# Patient Record
Sex: FEMALE | Race: BLACK OR AFRICAN AMERICAN | Employment: FULL TIME | URBAN - METROPOLITAN AREA
[De-identification: names, ages, dates, MRNs, and addresses within clinical notes are randomized per-mention and may not be internally consistent; named-entity substitution may affect disease eponyms.]

---

## 2020-03-10 DIAGNOSIS — D50.9 IRON DEFICIENCY ANEMIA, UNSPECIFIED IRON DEFICIENCY ANEMIA TYPE: ICD-10-CM

## 2020-03-10 DIAGNOSIS — K50.119 CROHN'S DISEASE OF COLON WITH COMPLICATION (HCC): ICD-10-CM

## 2020-03-10 RX ORDER — SODIUM CHLORIDE 9 MG/ML
INJECTION, SOLUTION INTRAVENOUS CONTINUOUS
Status: CANCELLED | OUTPATIENT
Start: 2020-03-17

## 2020-03-10 RX ORDER — DIPHENHYDRAMINE HYDROCHLORIDE 50 MG/ML
50 INJECTION INTRAMUSCULAR; INTRAVENOUS ONCE
Status: CANCELLED | OUTPATIENT
Start: 2020-03-17

## 2020-03-10 RX ORDER — EPINEPHRINE 1 MG/ML
0.3 INJECTION, SOLUTION, CONCENTRATE INTRAVENOUS PRN
Status: CANCELLED | OUTPATIENT
Start: 2020-03-17

## 2020-03-10 RX ORDER — SODIUM CHLORIDE 0.9 % (FLUSH) 0.9 %
10 SYRINGE (ML) INJECTION PRN
Status: CANCELLED | OUTPATIENT
Start: 2020-03-17

## 2020-03-10 RX ORDER — METHYLPREDNISOLONE SODIUM SUCCINATE 125 MG/2ML
125 INJECTION, POWDER, LYOPHILIZED, FOR SOLUTION INTRAMUSCULAR; INTRAVENOUS ONCE
Status: CANCELLED | OUTPATIENT
Start: 2020-03-17

## 2020-04-13 RX ORDER — MEPERIDINE HYDROCHLORIDE 25 MG/ML
12.5 INJECTION INTRAMUSCULAR; INTRAVENOUS; SUBCUTANEOUS ONCE
Status: CANCELLED | OUTPATIENT
Start: 2020-04-22

## 2020-04-13 RX ORDER — SODIUM CHLORIDE 9 MG/ML
INJECTION, SOLUTION INTRAVENOUS CONTINUOUS
Status: CANCELLED | OUTPATIENT
Start: 2020-04-22

## 2020-04-13 RX ORDER — METHYLPREDNISOLONE SODIUM SUCCINATE 125 MG/2ML
125 INJECTION, POWDER, LYOPHILIZED, FOR SOLUTION INTRAMUSCULAR; INTRAVENOUS ONCE
Status: CANCELLED | OUTPATIENT
Start: 2020-04-22

## 2020-04-13 RX ORDER — DIPHENHYDRAMINE HYDROCHLORIDE 50 MG/ML
50 INJECTION INTRAMUSCULAR; INTRAVENOUS ONCE
Status: CANCELLED | OUTPATIENT
Start: 2020-04-22

## 2020-04-13 RX ORDER — SODIUM CHLORIDE 0.9 % (FLUSH) 0.9 %
10 SYRINGE (ML) INJECTION PRN
Status: CANCELLED | OUTPATIENT
Start: 2020-04-22

## 2020-04-13 RX ORDER — EPINEPHRINE 1 MG/ML
0.3 INJECTION, SOLUTION, CONCENTRATE INTRAVENOUS PRN
Status: CANCELLED | OUTPATIENT
Start: 2020-04-22

## 2020-04-24 ENCOUNTER — TELEPHONE (OUTPATIENT)
Dept: INFUSION THERAPY | Age: 55
End: 2020-04-24

## 2020-04-24 NOTE — TELEPHONE ENCOUNTER
Called and left a message on voicemail to return call to Infusion Dept regarding an order for iron infusions we have from Dr. Bryan Haskins.

## 2020-05-04 ENCOUNTER — TELEPHONE (OUTPATIENT)
Dept: INFUSION THERAPY | Age: 55
End: 2020-05-04

## 2020-05-05 ENCOUNTER — HOSPITAL ENCOUNTER (OUTPATIENT)
Dept: INFUSION THERAPY | Age: 55
Setting detail: INFUSION SERIES
Discharge: HOME | End: 2020-05-05
Payer: COMMERCIAL

## 2020-05-05 VITALS
TEMPERATURE: 98.5 F | OXYGEN SATURATION: 100 % | SYSTOLIC BLOOD PRESSURE: 139 MMHG | RESPIRATION RATE: 18 BRPM | HEART RATE: 78 BPM | DIASTOLIC BLOOD PRESSURE: 68 MMHG

## 2020-05-05 DIAGNOSIS — D50.9 IRON DEFICIENCY ANEMIA, UNSPECIFIED IRON DEFICIENCY ANEMIA TYPE: Primary | ICD-10-CM

## 2020-05-05 DIAGNOSIS — K50.119 CROHN'S DISEASE OF COLON WITH COMPLICATION (HCC): ICD-10-CM

## 2020-05-05 PROCEDURE — 2580000003 HC RX 258: Performed by: INTERNAL MEDICINE

## 2020-05-05 PROCEDURE — 96374 THER/PROPH/DIAG INJ IV PUSH: CPT

## 2020-05-05 PROCEDURE — 6360000002 HC RX W HCPCS: Performed by: INTERNAL MEDICINE

## 2020-05-05 RX ORDER — SODIUM CHLORIDE 9 MG/ML
INJECTION, SOLUTION INTRAVENOUS CONTINUOUS
Status: CANCELLED | OUTPATIENT
Start: 2020-05-12

## 2020-05-05 RX ORDER — DIPHENHYDRAMINE HYDROCHLORIDE 50 MG/ML
50 INJECTION INTRAMUSCULAR; INTRAVENOUS ONCE
Status: CANCELLED | OUTPATIENT
Start: 2020-05-12

## 2020-05-05 RX ORDER — SODIUM CHLORIDE 0.9 % (FLUSH) 0.9 %
10 SYRINGE (ML) INJECTION PRN
Status: DISCONTINUED | OUTPATIENT
Start: 2020-05-05 | End: 2020-05-06 | Stop reason: HOSPADM

## 2020-05-05 RX ORDER — METHYLPREDNISOLONE SODIUM SUCCINATE 125 MG/2ML
125 INJECTION, POWDER, LYOPHILIZED, FOR SOLUTION INTRAMUSCULAR; INTRAVENOUS ONCE
Status: CANCELLED | OUTPATIENT
Start: 2020-05-12

## 2020-05-05 RX ORDER — SODIUM CHLORIDE 0.9 % (FLUSH) 0.9 %
10 SYRINGE (ML) INJECTION PRN
Status: CANCELLED | OUTPATIENT
Start: 2020-05-12

## 2020-05-05 RX ORDER — MEPERIDINE HYDROCHLORIDE 25 MG/ML
12.5 INJECTION INTRAMUSCULAR; INTRAVENOUS; SUBCUTANEOUS ONCE
Status: CANCELLED | OUTPATIENT
Start: 2020-05-12

## 2020-05-05 RX ORDER — EPINEPHRINE 1 MG/ML
0.3 INJECTION, SOLUTION, CONCENTRATE INTRAVENOUS PRN
Status: CANCELLED | OUTPATIENT
Start: 2020-05-12

## 2020-05-05 RX ADMIN — IRON SUCROSE 200 MG: 20 INJECTION, SOLUTION INTRAVENOUS at 12:35

## 2020-05-05 RX ADMIN — Medication 20 ML: at 12:35

## 2020-05-05 NOTE — PROGRESS NOTES
Outpatient [id number] [facility]     Venofer Visit    NAME:  Viki Aase  YOB: 1965  MEDICAL RECORD NUMBER:  [id number]  Episode Date:  5/5/2020    Patient arrived to [facility]   [] per wheelchair   [x] ambulatory     Patient here for first round on venofer. States she has had iron infusion in past at [facility] but does not know name of infusion she was given and did not have any problems with it. History of Crohn's and sees Dr. Erin Torres. Currently on stelara injections every 6 weeks with good control of Crohn's. Recent labs showed HGB 8.6 on 2/28/20 and Iron sat of 6% and ferritin of 13 on 3/4/2020. Patient denies any symptoms associated with anemia. Has the patient had a previous problem with Venofer Infusion? No  Any current infection or illness? No   Patient on antibiotics? No   History of Hypertension? Yes but not now. Was on lisinopril but lost about 60 lbs with dieting and was taken off lisinopril. Diastolic reading in 05'R with a couple of readings but came down. Encouraged patient to take BP at home periodically and keep a record to show her PCP. /68   Pulse 78   Temp 98.5 °F (36.9 °C) (Oral)   Resp 18   SpO2 100%   No data found.     Is the patient experiencing any:  Fatigue:   [x]   None  []   Increase over baseline but not altering normal activities  []   Moderate of causing difficulty performing some activities  []   Severe or loss of ability to perform some activities  []   Bedridden or disabling     Dizziness or Lightheadedness:   []   None  [x]   No Interference  []   Interferes with functioning but not activities of daily living  []   Interferes with daily activies  []   Bedridden or disabling    Shortness of Breath:   [x]   None   []   Dyspneic on exertion   []   Dyspnea with normal activities  []   Dyspnea at rest    Edema: none Location of edema: nothing    Tachycardia: No    Heart Palpitations: No      Chest Pain: No      BP 139/68   Pulse 78   Temp 98.5 °F (36.9 °C) (Oral)   Resp 18   SpO2 100%   No data found. Current Lab Data:  Hemoglobin/Hematocrit:    Lab Results   Component Value Date    HGB 12.0 06/03/2016    HCT 41.1 06/03/2016     IRON:    Lab Results   Component Value Date    IRON 59 05/13/2016     Iron Saturation:  No results found for: LABIRON  TIBC:    Lab Results   Component Value Date    TIBC 301 05/13/2016     FERRITIN:    Lab Results   Component Value Date    FERRITIN 168.0 05/13/2016     Patient given written and verbal information about Venofer actions and potential side effects. Dose Administered: 200 mg  Todays dose is number 1 out of 5 ordered for this patient. Response to treatment:  Well tolerated by patient. Education:    Indicates understanding     Scheduled to return for next dose of Venofer on May 8, 2020.      Electronically signed by Jeanette Puri RN on 5/5/2020 at 8:08 PM

## 2020-05-08 ENCOUNTER — HOSPITAL ENCOUNTER (OUTPATIENT)
Dept: INFUSION THERAPY | Age: 55
Setting detail: INFUSION SERIES
Discharge: HOME | End: 2020-05-08
Payer: COMMERCIAL

## 2020-05-08 VITALS
TEMPERATURE: 98.9 F | DIASTOLIC BLOOD PRESSURE: 87 MMHG | OXYGEN SATURATION: 98 % | HEART RATE: 88 BPM | SYSTOLIC BLOOD PRESSURE: 129 MMHG | RESPIRATION RATE: 18 BRPM

## 2020-05-08 DIAGNOSIS — K50.119 CROHN'S DISEASE OF COLON WITH COMPLICATION (HCC): ICD-10-CM

## 2020-05-08 DIAGNOSIS — D50.9 IRON DEFICIENCY ANEMIA, UNSPECIFIED IRON DEFICIENCY ANEMIA TYPE: Primary | ICD-10-CM

## 2020-05-08 PROCEDURE — 96365 THER/PROPH/DIAG IV INF INIT: CPT

## 2020-05-08 PROCEDURE — 6360000002 HC RX W HCPCS: Performed by: INTERNAL MEDICINE

## 2020-05-08 RX ORDER — MEPERIDINE HYDROCHLORIDE 25 MG/ML
12.5 INJECTION INTRAMUSCULAR; INTRAVENOUS; SUBCUTANEOUS ONCE
Status: CANCELLED | OUTPATIENT
Start: 2020-05-12

## 2020-05-08 RX ORDER — DIPHENHYDRAMINE HYDROCHLORIDE 50 MG/ML
50 INJECTION INTRAMUSCULAR; INTRAVENOUS ONCE
Status: CANCELLED | OUTPATIENT
Start: 2020-05-12

## 2020-05-08 RX ORDER — SODIUM CHLORIDE 0.9 % (FLUSH) 0.9 %
10 SYRINGE (ML) INJECTION PRN
Status: DISCONTINUED | OUTPATIENT
Start: 2020-05-08 | End: 2020-05-09 | Stop reason: HOSPADM

## 2020-05-08 RX ORDER — METHYLPREDNISOLONE SODIUM SUCCINATE 125 MG/2ML
125 INJECTION, POWDER, LYOPHILIZED, FOR SOLUTION INTRAMUSCULAR; INTRAVENOUS ONCE
Status: CANCELLED | OUTPATIENT
Start: 2020-05-12

## 2020-05-08 RX ORDER — EPINEPHRINE 1 MG/ML
0.3 INJECTION, SOLUTION, CONCENTRATE INTRAVENOUS PRN
Status: CANCELLED | OUTPATIENT
Start: 2020-05-12

## 2020-05-08 RX ORDER — SODIUM CHLORIDE 0.9 % (FLUSH) 0.9 %
10 SYRINGE (ML) INJECTION PRN
Status: CANCELLED | OUTPATIENT
Start: 2020-05-12

## 2020-05-08 RX ORDER — SODIUM CHLORIDE 9 MG/ML
INJECTION, SOLUTION INTRAVENOUS CONTINUOUS
Status: CANCELLED | OUTPATIENT
Start: 2020-05-12

## 2020-05-08 RX ADMIN — IRON SUCROSE 200 MG: 20 INJECTION, SOLUTION INTRAVENOUS at 12:32

## 2020-05-08 NOTE — PROGRESS NOTES
Outpatient [id number] [facility]     Venofer Visit    NAME:  Marlen Gallardo  YOB: 1965  MEDICAL RECORD NUMBER:  [id number]  Episode Date:  5/8/2020    Patient arrived to [facility] [address]   [] per wheelchair   [x] ambulatory      Patient had first dose earlier this week. States she had some abdominal cramping but denies any headache, lightheadedness, rash or itching. History of anemia and low iron with Crohn's disease and is on Stelara every 8 weeks. Has the patient had a previous problem with Venofer Infusion? No  Any current infection or illness? No   Patient on antibiotics? No   History of Hypertension? Yes but has been off BP pill due to good BP readings.        /88   Pulse 106   Temp 98.9 °F (37.2 °C) (Oral)   Resp 18   SpO2 98%   Patient Vitals for the past 2 hrs:   BP Temp Temp src Pulse Resp SpO2   05/08/20 1139 136/88 98.9 °F (37.2 °C) Oral 106 18 98 %       Is the patient experiencing any:  Fatigue:   [x]   None  []   Increase over baseline but not altering normal activities  []   Moderate of causing difficulty performing some activities  []   Severe or loss of ability to perform some activities  []   Bedridden or disabling     Dizziness or Lightheadedness:   [x]   None  []   No Interference  []   Interferes with functioning but not activities of daily living  []   Interferes with daily activies  []   Bedridden or disabling    Shortness of Breath:   [x]   None   []   Dyspneic on exertion   []   Dyspnea with normal activities  []   Dyspnea at rest    Edema: none Location of edema: nothing    Tachycardia: No    Heart Palpitations: No      Chest Pain: No      /88   Pulse 106   Temp 98.9 °F (37.2 °C) (Oral)   Resp 18   SpO2 98%   Patient Vitals for the past 2 hrs:   BP Temp Temp src Pulse Resp SpO2   05/08/20 1139 136/88 98.9 °F (37.2 °C) Oral 106 18 98 %       Current Lab Data:  Hemoglobin/Hematocrit: done on 2/28/2020:  8.6 and 29.4 3/4/20 IRON: 16               Iron Saturation:  6              TIBC:  273              FERRITIN:  13      Dose Administered: 200 mg  Todays dose is number 2 out of 5 ordered for this patient. Response to treatment:  Well tolerated by patient. Education:    Indicates understanding     Scheduled to return for next dose of Venofer on May 11, 2020.      Electronically signed by Dayana Dahl RN on 5/8/2020 at 12:00 PM.

## 2020-05-12 ENCOUNTER — APPOINTMENT (OUTPATIENT)
Dept: INFUSION THERAPY | Age: 55
End: 2020-05-12
Payer: COMMERCIAL

## 2020-05-15 ENCOUNTER — HOSPITAL ENCOUNTER (OUTPATIENT)
Dept: INFUSION THERAPY | Age: 55
Setting detail: INFUSION SERIES
Discharge: HOME | End: 2020-05-15
Payer: COMMERCIAL

## 2020-05-15 VITALS
DIASTOLIC BLOOD PRESSURE: 65 MMHG | HEART RATE: 85 BPM | SYSTOLIC BLOOD PRESSURE: 116 MMHG | OXYGEN SATURATION: 98 % | TEMPERATURE: 99.1 F | RESPIRATION RATE: 16 BRPM

## 2020-05-15 DIAGNOSIS — D50.9 IRON DEFICIENCY ANEMIA, UNSPECIFIED IRON DEFICIENCY ANEMIA TYPE: Primary | ICD-10-CM

## 2020-05-15 DIAGNOSIS — K50.119 CROHN'S DISEASE OF COLON WITH COMPLICATION (HCC): ICD-10-CM

## 2020-05-15 PROCEDURE — 6360000002 HC RX W HCPCS: Performed by: INTERNAL MEDICINE

## 2020-05-15 PROCEDURE — 2580000003 HC RX 258: Performed by: INTERNAL MEDICINE

## 2020-05-15 PROCEDURE — 96374 THER/PROPH/DIAG INJ IV PUSH: CPT

## 2020-05-15 RX ORDER — LISINOPRIL 10 MG/1
10 TABLET ORAL DAILY
COMMUNITY

## 2020-05-15 RX ORDER — MEPERIDINE HYDROCHLORIDE 25 MG/ML
12.5 INJECTION INTRAMUSCULAR; INTRAVENOUS; SUBCUTANEOUS ONCE
Status: CANCELLED | OUTPATIENT
Start: 2020-05-22

## 2020-05-15 RX ORDER — METHYLPREDNISOLONE SODIUM SUCCINATE 125 MG/2ML
125 INJECTION, POWDER, LYOPHILIZED, FOR SOLUTION INTRAMUSCULAR; INTRAVENOUS ONCE
Status: CANCELLED | OUTPATIENT
Start: 2020-05-22

## 2020-05-15 RX ORDER — SODIUM CHLORIDE 0.9 % (FLUSH) 0.9 %
10 SYRINGE (ML) INJECTION PRN
Status: CANCELLED | OUTPATIENT
Start: 2020-05-22

## 2020-05-15 RX ORDER — SODIUM CHLORIDE 0.9 % (FLUSH) 0.9 %
10 SYRINGE (ML) INJECTION PRN
Status: DISCONTINUED | OUTPATIENT
Start: 2020-05-15 | End: 2020-05-16 | Stop reason: HOSPADM

## 2020-05-15 RX ORDER — SODIUM CHLORIDE 9 MG/ML
INJECTION, SOLUTION INTRAVENOUS CONTINUOUS
Status: CANCELLED | OUTPATIENT
Start: 2020-05-22

## 2020-05-15 RX ORDER — DIPHENHYDRAMINE HYDROCHLORIDE 50 MG/ML
50 INJECTION INTRAMUSCULAR; INTRAVENOUS ONCE
Status: CANCELLED | OUTPATIENT
Start: 2020-05-22

## 2020-05-15 RX ORDER — EPINEPHRINE 1 MG/ML
0.3 INJECTION, SOLUTION, CONCENTRATE INTRAVENOUS PRN
Status: CANCELLED | OUTPATIENT
Start: 2020-05-22

## 2020-05-15 RX ADMIN — Medication 10 ML: at 11:58

## 2020-05-15 RX ADMIN — IRON SUCROSE 200 MG: 20 INJECTION, SOLUTION INTRAVENOUS at 11:40

## 2020-05-15 RX ADMIN — Medication 20 ML: at 11:36

## 2020-05-15 RX ADMIN — Medication 20 ML: at 12:18

## 2020-05-15 NOTE — PROGRESS NOTES
Outpatient [id number] [facility]     Venofer Visit    NAME:  Sampson Patel  YOB: 1965  MEDICAL RECORD NUMBER:  [id number]  Episode Date:  5/15/2020    Patient arrived to [facility]   [] per wheelchair   [x] ambulatory     Has the patient had a previous problem with Venofer Infusion? No  Any current infection or illness? No   Patient on antibiotics? No   History of Hypertension? Yes but was not taking antihypertensives at last visit on 5/11/2020. After last Venofer infusion on 5/11/2020, patient called her PCP regarding her blood pressure being elevated and patient was restarted on Lisinopril 10 mg daily. 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 /65   Pulse 85   Temp 99.1 °F (37.3 °C) (Oral)   Resp 16   SpO2 98%   Patient Vitals for the past 2 hrs:   BP Temp Temp src Pulse Resp SpO2   05/15/20 1214 116/65 99.1 °F (37.3 °C) Oral 85 16 98 %   05/15/20 1125 122/83 98.8 °F (37.1 °C) Oral 98 18 97 %       Is the patient experiencing any:  Fatigue:   [x]   None - patient reports that she had one episode of feeling drowsy after her 2nd Venofer infusion but has not had any other issues with fatigue.   []   Increase over baseline but not altering normal activities  []   Moderate of causing difficulty performing some activities  []   Severe or loss of ability to perform some activities  []   Bedridden or disabling     Dizziness or Lightheadedness:   [x]   None  []   No Interference  []   Interferes with functioning but not activities of daily living  []   Interferes with daily activies  []   Bedridden or disabling    Shortness of Breath:   []   None   [x]   Dyspneic on exertion - only with exercise  []   Dyspnea with normal activities  []   Dyspnea at rest    Edema:  No edema

## 2020-05-18 ENCOUNTER — TELEPHONE (OUTPATIENT)
Dept: INFUSION THERAPY | Age: 55
End: 2020-05-18

## 2020-05-18 NOTE — TELEPHONE ENCOUNTER
Call[facility] and [facility] with [facility]. Patient denies any fever, cough or SOB and reminded patient we are still located in [address] [place] [address]  285.

## 2020-05-19 ENCOUNTER — HOSPITAL ENCOUNTER (OUTPATIENT)
Dept: INFUSION THERAPY | Age: 55
Setting detail: INFUSION SERIES
Discharge: HOME | End: 2020-05-19
Payer: COMMERCIAL

## 2020-05-19 VITALS
HEART RATE: 94 BPM | DIASTOLIC BLOOD PRESSURE: 83 MMHG | OXYGEN SATURATION: 99 % | RESPIRATION RATE: 18 BRPM | SYSTOLIC BLOOD PRESSURE: 130 MMHG | TEMPERATURE: 98.8 F

## 2020-05-19 DIAGNOSIS — K50.119 CROHN'S DISEASE OF COLON WITH COMPLICATION (HCC): ICD-10-CM

## 2020-05-19 DIAGNOSIS — D50.9 IRON DEFICIENCY ANEMIA, UNSPECIFIED IRON DEFICIENCY ANEMIA TYPE: Primary | ICD-10-CM

## 2020-05-19 PROCEDURE — 96374 THER/PROPH/DIAG INJ IV PUSH: CPT

## 2020-05-19 PROCEDURE — 6360000002 HC RX W HCPCS: Performed by: INTERNAL MEDICINE

## 2020-05-19 PROCEDURE — 2580000003 HC RX 258: Performed by: INTERNAL MEDICINE

## 2020-05-19 RX ORDER — SODIUM CHLORIDE 0.9 % (FLUSH) 0.9 %
10 SYRINGE (ML) INJECTION PRN
Status: DISCONTINUED | OUTPATIENT
Start: 2020-05-19 | End: 2020-05-19 | Stop reason: ALTCHOICE

## 2020-05-19 RX ORDER — SODIUM CHLORIDE 9 MG/ML
INJECTION, SOLUTION INTRAVENOUS CONTINUOUS
Status: CANCELLED | OUTPATIENT
Start: 2020-05-19

## 2020-05-19 RX ORDER — EPINEPHRINE 1 MG/ML
0.3 INJECTION, SOLUTION, CONCENTRATE INTRAVENOUS PRN
Status: CANCELLED | OUTPATIENT
Start: 2020-05-19

## 2020-05-19 RX ORDER — MEPERIDINE HYDROCHLORIDE 25 MG/ML
12.5 INJECTION INTRAMUSCULAR; INTRAVENOUS; SUBCUTANEOUS ONCE
Status: CANCELLED | OUTPATIENT
Start: 2020-05-19

## 2020-05-19 RX ORDER — METHYLPREDNISOLONE SODIUM SUCCINATE 125 MG/2ML
125 INJECTION, POWDER, LYOPHILIZED, FOR SOLUTION INTRAMUSCULAR; INTRAVENOUS ONCE
Status: CANCELLED | OUTPATIENT
Start: 2020-05-19

## 2020-05-19 RX ORDER — DIPHENHYDRAMINE HYDROCHLORIDE 50 MG/ML
50 INJECTION INTRAMUSCULAR; INTRAVENOUS ONCE
Status: CANCELLED | OUTPATIENT
Start: 2020-05-19

## 2020-05-19 RX ORDER — SODIUM CHLORIDE 0.9 % (FLUSH) 0.9 %
10 SYRINGE (ML) INJECTION PRN
Status: CANCELLED | OUTPATIENT
Start: 2020-05-19

## 2020-05-19 RX ADMIN — IRON SUCROSE 200 MG: 20 INJECTION, SOLUTION INTRAVENOUS at 11:57

## 2020-05-19 RX ADMIN — Medication 10 ML: at 12:12

## 2020-05-19 RX ADMIN — Medication 10 ML: at 11:55

## 2020-05-19 RX ADMIN — Medication 10 ML: at 12:35

## 2020-05-19 NOTE — PROGRESS NOTES
Outpatient [id number] [facility]     Venofer Visit    NAME:  Marlen Gallardo  YOB: 1965  MEDICAL RECORD NUMBER:  [id number]  Episode Date:  5/19/2020    Patient arrived to [facility]   [] per wheelchair   [x] ambulatory     Has the patient had a previous problem with Venofer Infusion? No  Any current infection or illness? No   Patient on antibiotics? No   History of Hypertension? Yes - restarted her Lisinopril about 1 week ago and will follow up with PCP with her daily blood pressure readings. /87   Pulse 104   Temp 99 °F (37.2 °C) (Oral)   Resp 18   SpO2 99%   No data found. Is the patient experiencing any:  Fatigue:   [x]   None - patient denies feeling tired and states that her energy level is good  []   Increase over baseline but not altering normal activities  []   Moderate of causing difficulty performing some activities  []   Severe or loss of ability to perform some activities  []   Bedridden or disabling     Dizziness or Lightheadedness:   [x]   None  []   No Interference  []   Interferes with functioning but not activities of daily living  []   Interferes with daily activies  []   Bedridden or disabling    Shortness of Breath:   []   None   [x]   Dyspneic on exertion - only with exercise   []   Dyspnea with normal activities  []   Dyspnea at rest    Edema: None noted    Tachycardia: No    Heart Palpitations: No      Chest Pain: No      /87   Pulse 104   Temp 99 °F (37.2 °C) (Oral)   Resp 18   SpO2 99%   No data found.      Current Lab Data:   Drawn on 3/4/2020:  Iron Sat: 6         Iron: 16  Ferritin: 13    Drawn on 2/28/2020:  Hemoglobin - 8.6    Previous Lab Data:  Hemoglobin/Hematocrit:    Lab Results   Component Value Date    HGB 12.0 06/03/2016    HCT 41.1 06/03/2016     IRON:    Lab Results   Component Value Date    IRON 59 05/13/2016     TIBC:    Lab Results   Component Value Date    TIBC 301 05/13/2016     FERRITIN: Lab Results   Component Value Date    FERRITIN 168.0 05/13/2016     Dose Administered: 200 mg  Todays dose is number 5 out of 5 ordered for this patient. Response to treatment:  Well tolerated by patient. Education:    Verbal and written discharge instructions reviewed with patient. Verbalized understanding. Written copy given via AVS     Patient to follow up with Dr Isaias Harry and PCP.      Electronically signed by Sabrina Jensen RN on 5/19/2020 at 5:34 PM